# Patient Record
Sex: FEMALE | Race: WHITE | NOT HISPANIC OR LATINO | URBAN - NONMETROPOLITAN AREA
[De-identification: names, ages, dates, MRNs, and addresses within clinical notes are randomized per-mention and may not be internally consistent; named-entity substitution may affect disease eponyms.]

---

## 2017-06-24 ENCOUNTER — OFFICE VISIT (OUTPATIENT)
Dept: RETAIL CLINIC | Facility: CLINIC | Age: 37
End: 2017-06-24

## 2017-06-24 VITALS
TEMPERATURE: 99.2 F | OXYGEN SATURATION: 98 % | HEIGHT: 66 IN | WEIGHT: 238 LBS | RESPIRATION RATE: 18 BRPM | HEART RATE: 80 BPM | BODY MASS INDEX: 38.25 KG/M2

## 2017-06-24 DIAGNOSIS — H69.83 EUSTACHIAN TUBE DYSFUNCTION, BILATERAL: Primary | ICD-10-CM

## 2017-06-24 DIAGNOSIS — J30.2 SEASONAL ALLERGIC RHINITIS, UNSPECIFIED ALLERGIC RHINITIS TRIGGER: ICD-10-CM

## 2017-06-24 PROCEDURE — 99203 OFFICE O/P NEW LOW 30 MIN: CPT | Performed by: NURSE PRACTITIONER

## 2017-06-24 RX ORDER — ATENOLOL 25 MG/1
25 TABLET ORAL DAILY
COMMUNITY
End: 2021-04-19

## 2017-06-24 RX ORDER — LORATADINE 10 MG/1
10 TABLET ORAL DAILY
Qty: 30 TABLET | Refills: 0 | Status: SHIPPED | OUTPATIENT
Start: 2017-06-24 | End: 2017-07-24

## 2017-06-24 RX ORDER — FLUTICASONE PROPIONATE 50 MCG
2 SPRAY, SUSPENSION (ML) NASAL DAILY
Qty: 1 EACH | Refills: 0 | Status: SHIPPED | OUTPATIENT
Start: 2017-06-24 | End: 2017-07-24

## 2017-06-24 RX ORDER — LEVOTHYROXINE SODIUM 0.15 MG/1
150 TABLET ORAL DAILY
COMMUNITY
End: 2020-10-15

## 2017-06-24 NOTE — PROGRESS NOTES
Subjective   Usha Moe is a 36 y.o. female who presents to the clinic with complaints of earache and sore throat.    Sore Throat    This is a new problem. Episode onset: started 3 days ago. The problem has been gradually worsening. The pain is worse on the right side. There has been no fever. The pain is moderate. Associated symptoms include coughing, ear pain ( ears like they are burning and are itchy) and headaches. Pertinent negatives include no congestion. She has had no exposure to strep or mono. She has tried nothing for the symptoms.   Earache    There is pain in both ears. This is a new problem. The current episode started in the past 7 days. The problem occurs constantly. The problem has been gradually worsening. There has been no fever. Associated symptoms include coughing, headaches and a sore throat. She has tried nothing for the symptoms. recent ear infection a couple of months ago         No current outpatient prescriptions on file prior to visit.     No current facility-administered medications on file prior to visit.        No Known Allergies    Past Medical History:   Diagnosis Date   • Disease of thyroid gland        Past Surgical History:   Procedure Laterality Date   •  SECTION     • THYROIDECTOMY         Family History   Problem Relation Age of Onset   • Diabetes Mother        Social History     Social History   • Marital status:      Spouse name: N/A   • Number of children: N/A   • Years of education: N/A     Occupational History   • Not on file.     Social History Main Topics   • Smoking status: Never Smoker   • Smokeless tobacco: Not on file   • Alcohol use Not on file   • Drug use: Not on file   • Sexual activity: Not on file     Other Topics Concern   • Not on file     Social History Narrative   • No narrative on file       Review of Systems   Constitutional: Negative for chills, fatigue and fever.   HENT: Positive for ear pain ( ears like they are burning and are itchy)  "and sore throat. Negative for congestion, postnasal drip and sinus pressure.    Eyes: Positive for itching ( eyes feel itchy and are burning at times).   Respiratory: Positive for cough.    Neurological: Positive for dizziness ( was dizzy last night) and headaches.   All other systems reviewed and are negative.      Pulse 80  Temp 99.2 °F (37.3 °C) (Oral)   Resp 18  Ht 66\" (167.6 cm)  Wt 238 lb (108 kg)  SpO2 98%  BMI 38.41 kg/m2    Objective   Physical Exam   Constitutional: She is oriented to person, place, and time. She appears well-developed and well-nourished. She does not appear ill. No distress.   Temp-99.2   HENT:   Head: Normocephalic and atraumatic.   Right Ear: Hearing, external ear and ear canal normal. A middle ear effusion ( small ) is present.   Left Ear: Hearing, external ear and ear canal normal. A middle ear effusion ( greater in left ) is present.   Nose: Mucosal edema and rhinorrhea present. Right sinus exhibits no maxillary sinus tenderness and no frontal sinus tenderness. Left sinus exhibits no maxillary sinus tenderness and no frontal sinus tenderness.   Mouth/Throat: Uvula is midline and mucous membranes are normal. Posterior oropharyngeal erythema ( slightly red) present. No oropharyngeal exudate or posterior oropharyngeal edema. Tonsils are 0 on the right. Tonsils are 0 on the left. Tonsillar exudate.   Eyes: Pupils are equal, round, and reactive to light.   Neck: Normal range of motion.   Cardiovascular: Normal rate, regular rhythm, S1 normal, S2 normal and normal heart sounds.    Pulmonary/Chest: Effort normal and breath sounds normal. No respiratory distress.   Abdominal: Soft. Bowel sounds are normal. She exhibits no distension. There is no tenderness.   Musculoskeletal: Normal range of motion.   Lymphadenopathy:     She has no cervical adenopathy.   Neurological: She is alert and oriented to person, place, and time.   Skin: Skin is warm and dry.   Psychiatric: She has a normal " mood and affect. Her behavior is normal. Thought content normal.   Nursing note and vitals reviewed.      No results found for this or any previous visit.  Assessment/Plan   Usha was seen today for sore throat and earache.    Diagnoses and all orders for this visit:    Eustachian tube dysfunction, bilateral    Seasonal allergic rhinitis, unspecified allergic rhinitis trigger    Other orders  -     loratadine (CLARITIN) 10 MG tablet; Take 1 tablet by mouth Daily for 30 days.  -     fluticasone (FLONASE) 50 MCG/ACT nasal spray; 2 sprays into each nostril Daily for 30 days.

## 2017-06-24 NOTE — PATIENT INSTRUCTIONS
"Upper Respiratory Infection, Adult  Most upper respiratory infections (URIs) are a viral infection of the air passages leading to the lungs. A URI affects the nose, throat, and upper air passages. The most common type of URI is nasopharyngitis and is typically referred to as \"the common cold.\"  URIs run their course and usually go away on their own. Most of the time, a URI does not require medical attention, but sometimes a bacterial infection in the upper airways can follow a viral infection. This is called a secondary infection. Sinus and middle ear infections are common types of secondary upper respiratory infections.  Bacterial pneumonia can also complicate a URI. A URI can worsen asthma and chronic obstructive pulmonary disease (COPD). Sometimes, these complications can require emergency medical care and may be life threatening.   CAUSES  Almost all URIs are caused by viruses. A virus is a type of germ and can spread from one person to another.   RISKS FACTORS  You may be at risk for a URI if:   · You smoke.    · You have chronic heart or lung disease.  · You have a weakened defense (immune) system.    · You are very young or very old.    · You have nasal allergies or asthma.  · You work in crowded or poorly ventilated areas.  · You work in health care facilities or schools.  SIGNS AND SYMPTOMS   Symptoms typically develop 2-3 days after you come in contact with a cold virus. Most viral URIs last 7-10 days. However, viral URIs from the influenza virus (flu virus) can last 14-18 days and are typically more severe. Symptoms may include:   · Runny or stuffy (congested) nose.    · Sneezing.    · Cough.    · Sore throat.    · Headache.    · Fatigue.    · Fever.    · Loss of appetite.    · Pain in your forehead, behind your eyes, and over your cheekbones (sinus pain).  · Muscle aches.    DIAGNOSIS   Your health care provider may diagnose a URI by:  · Physical exam.  · Tests to check that your symptoms are not due to " another condition such as:  ¨ Strep throat.  ¨ Sinusitis.  ¨ Pneumonia.  ¨ Asthma.  TREATMENT   A URI goes away on its own with time. It cannot be cured with medicines, but medicines may be prescribed or recommended to relieve symptoms. Medicines may help:  · Reduce your fever.  · Reduce your cough.  · Relieve nasal congestion.  HOME CARE INSTRUCTIONS   · Take medicines only as directed by your health care provider.    · Gargle warm saltwater or take cough drops to comfort your throat as directed by your health care provider.  · Use a warm mist humidifier or inhale steam from a shower to increase air moisture. This may make it easier to breathe.  · Drink enough fluid to keep your urine clear or pale yellow.    · Eat soups and other clear broths and maintain good nutrition.    · Rest as needed.    · Return to work when your temperature has returned to normal or as your health care provider advises. You may need to stay home longer to avoid infecting others. You can also use a face mask and careful hand washing to prevent spread of the virus.  · Increase the usage of your inhaler if you have asthma.    · Do not use any tobacco products, including cigarettes, chewing tobacco, or electronic cigarettes. If you need help quitting, ask your health care provider.  PREVENTION   The best way to protect yourself from getting a cold is to practice good hygiene.   · Avoid oral or hand contact with people with cold symptoms.    · Wash your hands often if contact occurs.    There is no clear evidence that vitamin C, vitamin E, echinacea, or exercise reduces the chance of developing a cold. However, it is always recommended to get plenty of rest, exercise, and practice good nutrition.   SEEK MEDICAL CARE IF:   · You are getting worse rather than better.    · Your symptoms are not controlled by medicine.    · You have chills.  · You have worsening shortness of breath.  · You have brown or red mucus.  · You have yellow or brown nasal  discharge.  · You have pain in your face, especially when you bend forward.  · You have a fever.  · You have swollen neck glands.  · You have pain while swallowing.  · You have white areas in the back of your throat.  SEEK IMMEDIATE MEDICAL CARE IF:   · You have severe or persistent:    Headache.    Ear pain.    Sinus pain.    Chest pain.  · You have chronic lung disease and any of the following:    Wheezing.    Prolonged cough.    Coughing up blood.    A change in your usual mucus.  · You have a stiff neck.  · You have changes in your:    Vision.    Hearing.    Thinking.    Mood.  MAKE SURE YOU:   · Understand these instructions.  · Will watch your condition.  · Will get help right away if you are not doing well or get worse.     This information is not intended to replace advice given to you by your health care provider. Make sure you discuss any questions you have with your health care provider.     Document Released: 06/13/2002 Document Revised: 05/03/2016 Document Reviewed: 03/25/2015  [employer] Interactive Patient Education ©2017 [employer]  Nasal Allergies  Nasal allergies are a reaction to allergens in the air. Allergens are particles in the air that cause your body to have an allergic reaction. Nasal allergies are not passed from person to person (are not contagious). They cannot be cured, but they can be controlled.  CAUSES  Seasonal nasal allergies (hay fever) are caused by pollen allergens that come from grasses, trees, and weeds. Year-round nasal allergies (perennial allergic rhinitis) are caused by allergens such as house dust mites, pet dander, and mold spores.  RISK FACTORS  The following factors may make you more likely to develop this condition:  · Having certain health conditions. These include:    Other types of allergies, such as food allergies.    Asthma.    Eczema.  · Having a close relative who has allergies or asthma.  · Exposure to house dust, pollen, dander, or other allergens at home or  at work.  · Exposure to air pollution or secondhand smoke when you were a child.  SYMPTOMS  Symptoms of this condition include:  · Sneezing.  · Runny nose or stuffy nose (congestion).  · Watery (tearing) eyes.  · Itchy eyes, nose, mouth, throat, skin, or other area.  · Sore throat.  · Headache.  · Decreased sense of smell or taste.  · Fatigue. This may occur if you have trouble sleeping due to allergies.  · Swollen eyelids.  DIAGNOSIS  This condition is diagnosed with a medical history and physical exam. Allergy testing may be done to determine exactly what triggers your nasal allergies.  TREATMENT  There is no cure for nasal allergies. Treatment focuses on controlling your symptoms, and it may include:  · Medicines that block allergy symptoms. These may include allergy shots, nasal sprays, and oral antihistamines.  · Avoiding the allergen.  HOME CARE INSTRUCTIONS  · Avoid the allergen that is causing your symptoms, if possible.  · Keep windows closed. If possible, use air conditioning when pollen counts are high.  · Do not use fans in your home.  · Do not hang clothes outside to dry.  · Wear sunglasses to keep pollen out of your eyes.  · Wash your hands right away after you touch household pets.  · Take over-the-counter and prescription medicines only as told by your health care provider.  · Keep all follow-up visits as told by your health care provider. This is important.  SEEK MEDICAL CARE IF:  · You have a fever.  · You develop a cough that does not go away (is persistent).  · You start to wheeze.  · Your symptoms do not improve with treatment.  · You have thick nasal discharge.  · You start to have nosebleeds.  SEEK IMMEDIATE MEDICAL CARE IF:  · Your tongue or your lips are swollen.  · You have trouble breathing.  · You feel light-headed or you feel like you are going to faint.  · You have cold sweats.     This information is not intended to replace advice given to you by your health care provider. Make sure  you discuss any questions you have with your health care provider.     Document Released: 12/18/2006 Document Revised: 04/10/2017 Document Reviewed: 06/29/2016  [employer] Interactive Patient Education ©2017 [employer]  Eustachian Tube Dysfunction  The eustachian tube connects the middle ear to the back of the nose. It regulates air pressure in the middle ear by allowing air to move between the ear and nose. It also helps to drain fluid from the middle ear space. When the eustachian tube does not function properly, air pressure, fluid, or both can build up in the middle ear.  Eustachian tube dysfunction can affect one or both ears.  CAUSES  This condition happens when the eustachian tube becomes blocked or cannot open normally. This may result from:  · Ear infections.  · Colds and other upper respiratory infections.  · Allergies.  · Irritation, such as from cigarette smoke or acid from the stomach coming up into the esophagus (gastroesophageal reflux).  · Sudden changes in air pressure, such as from descending in an airplane.  · Abnormal growths in the nose or throat, such as nasal polyps, tumors, or enlarged tissue at the back of the throat (adenoids).  RISK FACTORS  This condition may be more likely to develop in people who smoke and people who are overweight. Eustachian tube dysfunction may also be more likely to develop in children, especially children who have:  · Certain birth defects of the mouth, such as cleft palate.  · Large tonsils and adenoids.  SYMPTOMS  Symptoms of this condition may include:  · A feeling of fullness in the ear.  · Ear pain.  · Clicking or popping noises in the ear.  · Ringing in the ear.  · Hearing loss.  · Loss of balance.  Symptoms may get worse when the air pressure around you changes, such as when you travel to an area of high elevation or fly on an airplane.   DIAGNOSIS  This condition may be diagnosed based on:  · Your symptoms.  · A physical exam of your ear, nose, and  "throat.  · Tests, such as those that measure:    The movement of your eardrum (tympanogram).    Your hearing (audiometry).  TREATMENT  Treatment depends on the cause and severity of your condition. If your symptoms are mild, you may be able to relieve your symptoms by moving air into (\"popping\") your ears. If you have symptoms of fluid in your ears, treatment may include:  · Decongestants.  · Antihistamines.  · Nasal sprays or ear drops that contain medicines that reduce swelling (steroids).  In some cases, you may need to have a procedure to drain the fluid in your eardrum (myringotomy). In this procedure, a small tube is placed in the eardrum to:  · Drain the fluid.  · Restore the air in the middle ear space.  HOME CARE INSTRUCTIONS  · Take over-the-counter and prescription medicines only as told by your health care provider.  · Use techniques to help pop your ears as recommended by your health care provider. These may include:    Chewing gum.    Yawning.    Frequent, forceful swallowing.    Closing your mouth, holding your nose closed, and gently blowing as if you are trying to blow air out of your nose.  · Do not do any of the following until your health care provider approves:    Travel to high altitudes.    Fly in airplanes.    Work in a pressurized cabin or room.    Scuba dive.  · Keep your ears dry. Dry your ears completely after showering or bathing.  · Do not smoke.  · Keep all follow-up visits as told by your health care provider. This is important.  SEEK MEDICAL CARE IF:  · Your symptoms do not go away after treatment.  · Your symptoms come back after treatment.  · You are unable to pop your ears.  · You have:    A fever.    Pain in your ear.    Pain in your head or neck.    Fluid draining from your ear.  · Your hearing suddenly changes.  · You become very dizzy.  · You lose your balance.     This information is not intended to replace advice given to you by your health care provider. Make sure you " discuss any questions you have with your health care provider.     Document Released: 01/13/2017 Document Reviewed: 01/13/2017  [employer] Interactive Patient Education ©2017 [employer]

## 2020-10-14 RX ORDER — LEVOTHYROXINE SODIUM 0.15 MG/1
150 TABLET ORAL DAILY
Status: CANCELLED | OUTPATIENT
Start: 2020-10-14

## 2020-10-15 RX ORDER — LEVOTHYROXINE SODIUM 175 UG/1
175 TABLET ORAL DAILY
Qty: 30 TABLET | Refills: 0 | Status: SHIPPED | OUTPATIENT
Start: 2020-10-15 | End: 2020-10-20 | Stop reason: DRUGHIGH

## 2020-10-15 NOTE — TELEPHONE ENCOUNTER
Called pt to verify dose-- records indocated she was on levothyroxine 175mcg daily-- and she confirmed-- sent rx.  RT

## 2020-10-19 ENCOUNTER — LAB (OUTPATIENT)
Dept: LAB | Facility: HOSPITAL | Age: 40
End: 2020-10-19

## 2020-10-19 ENCOUNTER — OFFICE VISIT (OUTPATIENT)
Dept: ENDOCRINOLOGY | Facility: CLINIC | Age: 40
End: 2020-10-19

## 2020-10-19 VITALS
HEART RATE: 80 BPM | WEIGHT: 242 LBS | TEMPERATURE: 98 F | SYSTOLIC BLOOD PRESSURE: 126 MMHG | HEIGHT: 63 IN | OXYGEN SATURATION: 98 % | DIASTOLIC BLOOD PRESSURE: 68 MMHG | BODY MASS INDEX: 42.88 KG/M2

## 2020-10-19 DIAGNOSIS — E89.0 POST-SURGICAL HYPOTHYROIDISM: Primary | ICD-10-CM

## 2020-10-19 DIAGNOSIS — E89.0 POST-SURGICAL HYPOTHYROIDISM: ICD-10-CM

## 2020-10-19 LAB
T4 FREE SERPL-MCNC: 1.3 NG/DL (ref 0.93–1.7)
TSH SERPL DL<=0.05 MIU/L-ACNC: 4.58 UIU/ML (ref 0.27–4.2)

## 2020-10-19 PROCEDURE — 84439 ASSAY OF FREE THYROXINE: CPT | Performed by: PHYSICIAN ASSISTANT

## 2020-10-19 PROCEDURE — 99213 OFFICE O/P EST LOW 20 MIN: CPT | Performed by: PHYSICIAN ASSISTANT

## 2020-10-19 PROCEDURE — 84443 ASSAY THYROID STIM HORMONE: CPT | Performed by: PHYSICIAN ASSISTANT

## 2020-10-19 NOTE — PROGRESS NOTES
"     Office Note      Date: 10/19/2020  Patient Name: Usha Moe  MRN: [id number]  : 1980    Chief Complaint   Patient presents with   • Thyroid Problem       History of Present Illness:   Usha Moe is a 40 y.o. female who presents today for hypothyroidism.  Increased her levothyroxine dose in March to 175 mcg daily.  She reports she has felt okay on the new dose.  She reports she has noted weight gain and has noted some muscle spasms in her back at times.  He is still in the process of trying to adopt her nephew.  She denies any changes in her neck or eyes.  She denies any other new medical history since her last appointment.    Subjective      Review of Systems:  Review of Systems   Constitutional: Positive for unexpected weight change. Negative for activity change, appetite change, chills, diaphoresis, fatigue and fever.   HENT: Negative.    Eyes: Negative.    Respiratory: Negative.    Cardiovascular: Negative.    Gastrointestinal: Negative.    Endocrine: Negative.    Genitourinary: Negative.    Musculoskeletal: Negative.    Skin: Negative.    Neurological: Negative.    Hematological: Negative.    Psychiatric/Behavioral: Negative.        The following portions of the patient's history were reviewed and updated as appropriate: allergies, current medications, past family history, past medical history, past social history, past surgical history and problem list.    Objective     Vitals:    10/19/20 1503   BP: 126/68   BP Location: Left arm   Patient Position: Sitting   Cuff Size: Adult   Pulse: 80   Temp: 98 °F (36.7 °C)   TempSrc: Infrared   SpO2: 98%   Weight: 110 kg (242 lb)   Height: 160 cm (63\")   PainSc: 0-No pain     Body mass index is 42.87 kg/m².    Physical Exam  Constitutional:       General: She is not in acute distress.     Appearance: Normal appearance.   Neck:      Thyroid: No thyroid mass, thyromegaly or thyroid tenderness.   Lymphadenopathy:      Cervical: No cervical adenopathy. "   Neurological:      Mental Status: She is alert.           Assessment / Plan      Assessment & Plan:  1. Post-surgical hypothyroidism  Thyroid labs pending.  Will send note with results and plan.  Will send refill for prescription once labs reviewed.  Patient to call as needed.  We will plan on follow-up in 6 months unless dose adjusted then will see patient in 3 months.  - TSH; Future  - T4, Free; Future       Return in about 6 months (around 4/19/2021) for Recheck.    JAKE Chicas   10/19/2020

## 2020-10-20 RX ORDER — LEVOTHYROXINE SODIUM 0.2 MG/1
200 TABLET ORAL DAILY
Qty: 90 TABLET | Refills: 1 | Status: SHIPPED | OUTPATIENT
Start: 2020-10-20 | End: 2021-04-20 | Stop reason: SDUPTHER

## 2021-04-19 ENCOUNTER — OFFICE VISIT (OUTPATIENT)
Dept: ENDOCRINOLOGY | Facility: CLINIC | Age: 41
End: 2021-04-19

## 2021-04-19 ENCOUNTER — LAB (OUTPATIENT)
Dept: LAB | Facility: HOSPITAL | Age: 41
End: 2021-04-19

## 2021-04-19 VITALS
WEIGHT: 227.6 LBS | DIASTOLIC BLOOD PRESSURE: 84 MMHG | TEMPERATURE: 97.3 F | HEART RATE: 83 BPM | BODY MASS INDEX: 38.86 KG/M2 | OXYGEN SATURATION: 99 % | HEIGHT: 64 IN | SYSTOLIC BLOOD PRESSURE: 154 MMHG

## 2021-04-19 DIAGNOSIS — E89.0 POSTSURGICAL HYPOTHYROIDISM: Primary | ICD-10-CM

## 2021-04-19 DIAGNOSIS — E89.0 POSTSURGICAL HYPOTHYROIDISM: ICD-10-CM

## 2021-04-19 LAB
T4 FREE SERPL-MCNC: 1.9 NG/DL (ref 0.93–1.7)
TSH SERPL DL<=0.05 MIU/L-ACNC: 0.05 UIU/ML (ref 0.27–4.2)

## 2021-04-19 PROCEDURE — 84443 ASSAY THYROID STIM HORMONE: CPT

## 2021-04-19 PROCEDURE — 99213 OFFICE O/P EST LOW 20 MIN: CPT | Performed by: PHYSICIAN ASSISTANT

## 2021-04-19 PROCEDURE — 84439 ASSAY OF FREE THYROXINE: CPT

## 2021-04-19 NOTE — PROGRESS NOTES
"     Office Note      Date: 2021  Patient Name: Usha Moe  MRN: [id number]  : 1980    Chief Complaint   Patient presents with   • Thyroid Problem       History of Present Illness:   Usha Moe is a 40 y.o. female who presents today for hypothyroidism.  She reports she is taking the levothyroxine 200 mcg regularly and correctly.  She reports she has a headache today and her blood pressure was higher than normal.  She reports she has noted increased headaches for the last 1 to 2 weeks.  She reports she has felt stiff and has noted increased fatigue.  She reports she does have some heat intolerance and notes palpitations occasionally.  She denies any changes in her neck today.  She reports she has officially adopted her 2-year-old nephew and all is going well.    Subjective      Review of Systems:  Review of Systems   Constitutional: Positive for fatigue.   Cardiovascular: Positive for palpitations. Negative for chest pain and leg swelling.   Gastrointestinal: Negative.    Endocrine: Positive for heat intolerance.   Neurological: Positive for headaches.       The following portions of the patient's history were reviewed and updated as appropriate: allergies, current medications, past family history, past medical history, past social history, past surgical history and problem list.    Objective     Vitals:    21 1305 21 1342   BP: 160/84 154/84   BP Location:  Right arm   Patient Position:  Sitting   Pulse: 83    Temp: 97.3 °F (36.3 °C)    TempSrc: Infrared    SpO2: 99%    Weight: 103 kg (227 lb 9.6 oz)    Height: 162.6 cm (64\")    PainSc:   7    PainLoc: Head      Body mass index is 39.07 kg/m².    Physical Exam  Vitals reviewed.   Constitutional:       General: She is not in acute distress.     Appearance: Normal appearance.   Neck:      Thyroid: No thyroid mass or thyroid tenderness.      Comments: No palpable thyroid tissue  Lymphadenopathy:      Cervical: No cervical adenopathy. "   Neurological:      Mental Status: She is alert.             Assessment / Plan      Assessment & Plan:  1. Postsurgical hypothyroidism  TFTs pending.  Will send note with results and plan.  She will continue levothyroxine 200 mcg daily for now.  I will send a new prescription once her labs are reviewed.  Rechecked her blood pressure and this was slightly lower but encouraged her to check this at home for monitoring.  She reports she has a cuff.  She will contact her primary care physician if this remains elevated.  - T4, Free; Future  - TSH; Future       Return in about 6 months (around 10/19/2021) for Recheck.    JAKE Chicas   04/19/2021

## 2021-04-20 RX ORDER — LEVOTHYROXINE SODIUM 175 UG/1
175 TABLET ORAL EVERY OTHER DAY
Qty: 45 TABLET | Refills: 1 | Status: SHIPPED | OUTPATIENT
Start: 2021-04-20 | End: 2021-07-23 | Stop reason: SDUPTHER

## 2021-04-20 RX ORDER — LEVOTHYROXINE SODIUM 0.2 MG/1
200 TABLET ORAL EVERY OTHER DAY
Qty: 45 TABLET | Refills: 1 | Status: SHIPPED | OUTPATIENT
Start: 2021-04-20 | End: 2021-09-07 | Stop reason: SDUPTHER

## 2021-04-21 ENCOUNTER — TELEPHONE (OUTPATIENT)
Dept: ENDOCRINOLOGY | Facility: CLINIC | Age: 41
End: 2021-04-21

## 2021-07-23 RX ORDER — LEVOTHYROXINE SODIUM 175 UG/1
175 TABLET ORAL EVERY OTHER DAY
Qty: 45 TABLET | Refills: 0 | Status: SHIPPED | OUTPATIENT
Start: 2021-07-23 | End: 2021-09-07 | Stop reason: SDUPTHER

## 2021-07-23 NOTE — TELEPHONE ENCOUNTER
Patient called and had to reschedule her appointment that was scheduled for Monday 7/26/21. She said she is going to need a refill on her levothyroxine (SYNTHROID, LEVOTHROID) 175 MCG tablet.

## 2021-09-02 ENCOUNTER — LAB (OUTPATIENT)
Dept: LAB | Facility: HOSPITAL | Age: 41
End: 2021-09-02

## 2021-09-02 ENCOUNTER — OFFICE VISIT (OUTPATIENT)
Dept: ENDOCRINOLOGY | Facility: CLINIC | Age: 41
End: 2021-09-02

## 2021-09-02 VITALS
OXYGEN SATURATION: 99 % | WEIGHT: 249 LBS | HEART RATE: 80 BPM | HEIGHT: 64 IN | DIASTOLIC BLOOD PRESSURE: 92 MMHG | SYSTOLIC BLOOD PRESSURE: 158 MMHG | BODY MASS INDEX: 42.51 KG/M2

## 2021-09-02 DIAGNOSIS — E89.0 POSTSURGICAL HYPOTHYROIDISM: ICD-10-CM

## 2021-09-02 DIAGNOSIS — E89.0 POSTSURGICAL HYPOTHYROIDISM: Primary | ICD-10-CM

## 2021-09-02 LAB
T4 FREE SERPL-MCNC: 1.49 NG/DL (ref 0.93–1.7)
TSH SERPL DL<=0.05 MIU/L-ACNC: 2.28 UIU/ML (ref 0.27–4.2)

## 2021-09-02 PROCEDURE — 84443 ASSAY THYROID STIM HORMONE: CPT

## 2021-09-02 PROCEDURE — 99213 OFFICE O/P EST LOW 20 MIN: CPT | Performed by: PHYSICIAN ASSISTANT

## 2021-09-02 PROCEDURE — 84439 ASSAY OF FREE THYROXINE: CPT

## 2021-09-02 NOTE — PROGRESS NOTES
"     Office Note      Date: 2021  Patient Name: Usha Moe  MRN: [id number]  : 1980    Chief Complaint   Patient presents with   • Hypothyroidism       History of Present Illness:   Usha Moe is a 40 y.o. female who presents today for follow-up for hypothyroidism.  At her appointment in April we reduced her levothyroxine dose from 200 mcg daily to alternating 175 and 200 mcg every other day.  She reports her energy has been okay on the reduced dose and the palpitations have resolved.  She reports she continues to have some headaches but this is been better as well.  She reports about 2 or 3 weeks ago she was taking 175 mcg daily because she was out of the 200 mcg and needed to  the prescription.  She has been back on the alternating dose for 2 or 3 weeks now.  She reports things have been really stressful at work a lot of people have been out with Covid.  She reports she has not been vaccinated and is unsure if she plans to or not.  She denies any changes in her neck today.    Subjective      Review of Systems:  Review of Systems   Constitutional: Negative.    Cardiovascular: Negative.    Gastrointestinal: Negative.    Endocrine: Negative.    Neurological: Positive for headaches.       The following portions of the patient's history were reviewed and updated as appropriate: allergies, current medications, past family history, past medical history, past social history, past surgical history and problem list.    Objective     Vitals:    21 1323   BP: 158/92   BP Location: Left arm   Patient Position: Sitting   Cuff Size: Adult   Pulse: 80   SpO2: 99%   Weight: 113 kg (249 lb)   Height: 162.6 cm (64\")   PainSc: 0-No pain     Body mass index is 42.74 kg/m².    Physical Exam  Vitals reviewed.   Constitutional:       General: She is not in acute distress.     Appearance: Normal appearance.   Neurological:      Mental Status: She is alert.         TSH  Lab Results   Component Value Date "    TSH 0.046 (L) 04/19/2021       FREE T4  Lab Results   Component Value Date    FREET4 1.90 (H) 04/19/2021          Assessment / Plan      Assessment & Plan:  1. Postsurgical hypothyroidism  TFTs pending.  Will send note with results and plan.  Will send new prescription once labs have been reviewed.  Her blood pressure is elevated today.  We discussed this potentially contributing to her headaches.  I encouraged her to check in with her primary care physician if her blood pressure remains elevated at home.  Discussed the Covid vaccine my personal experience with it as well as my families.  I encouraged her to get the vaccine.  - T4, Free; Future  - TSH; Future       Return in about 3 months (around 12/2/2021) for Recheck 3-4 mos.     This note was dictated using Dragon voice recognition.    JAKE Chicas   09/02/2021

## 2021-09-07 RX ORDER — LEVOTHYROXINE SODIUM 175 UG/1
175 TABLET ORAL EVERY OTHER DAY
Qty: 45 TABLET | Refills: 1 | Status: SHIPPED | OUTPATIENT
Start: 2021-09-07 | End: 2021-10-25 | Stop reason: SDUPTHER

## 2021-09-07 RX ORDER — LEVOTHYROXINE SODIUM 0.2 MG/1
200 TABLET ORAL EVERY OTHER DAY
Qty: 45 TABLET | Refills: 1 | Status: SHIPPED | OUTPATIENT
Start: 2021-09-07 | End: 2021-11-23 | Stop reason: SDUPTHER

## 2021-10-22 NOTE — TELEPHONE ENCOUNTER
Patient called and stated she is out of her levothyroxine (SYNTHROID, LEVOTHROID) 175 MCG tablet and needs a refill. Please advise.

## 2021-10-25 RX ORDER — LEVOTHYROXINE SODIUM 175 UG/1
175 TABLET ORAL EVERY OTHER DAY
Qty: 45 TABLET | Refills: 0 | Status: SHIPPED | OUTPATIENT
Start: 2021-10-25 | End: 2021-11-23

## 2021-11-23 RX ORDER — LEVOTHYROXINE SODIUM 175 UG/1
TABLET ORAL
Qty: 45 TABLET | Refills: 0 | Status: SHIPPED | OUTPATIENT
Start: 2021-11-23 | End: 2022-01-04 | Stop reason: SDUPTHER

## 2021-11-23 RX ORDER — LEVOTHYROXINE SODIUM 0.2 MG/1
200 TABLET ORAL EVERY OTHER DAY
Qty: 45 TABLET | Refills: 0 | Status: SHIPPED | OUTPATIENT
Start: 2021-11-23 | End: 2022-01-04 | Stop reason: SDUPTHER

## 2022-01-03 ENCOUNTER — LAB (OUTPATIENT)
Dept: LAB | Facility: HOSPITAL | Age: 42
End: 2022-01-03

## 2022-01-03 ENCOUNTER — OFFICE VISIT (OUTPATIENT)
Dept: ENDOCRINOLOGY | Facility: CLINIC | Age: 42
End: 2022-01-03

## 2022-01-03 VITALS
OXYGEN SATURATION: 97 % | BODY MASS INDEX: 42.99 KG/M2 | DIASTOLIC BLOOD PRESSURE: 82 MMHG | TEMPERATURE: 97.3 F | SYSTOLIC BLOOD PRESSURE: 126 MMHG | HEIGHT: 64 IN | WEIGHT: 251.8 LBS | HEART RATE: 100 BPM

## 2022-01-03 DIAGNOSIS — E89.0 POSTSURGICAL HYPOTHYROIDISM: Primary | ICD-10-CM

## 2022-01-03 DIAGNOSIS — E89.0 POSTSURGICAL HYPOTHYROIDISM: ICD-10-CM

## 2022-01-03 PROCEDURE — 84443 ASSAY THYROID STIM HORMONE: CPT

## 2022-01-03 PROCEDURE — 99213 OFFICE O/P EST LOW 20 MIN: CPT | Performed by: PHYSICIAN ASSISTANT

## 2022-01-03 PROCEDURE — 84439 ASSAY OF FREE THYROXINE: CPT

## 2022-01-03 NOTE — PROGRESS NOTES
"     Office Note      Date: 2022  Patient Name: Usha Moe  MRN: [id number]  : 1980    Chief Complaint   Patient presents with   • Hypothyroidism     3 month follow up        History of Present Illness:   Usha Moe is a 41 y.o. female who presents today for hypothyroidism.  She remains on levothyroxine 200 mcg and 175 mcg alternating every other day.  She reports she is taking this regularly and correctly.  She reports she feels well today with no symptoms of hyper or hypothyroidism.    Subjective      Review of Systems:  Review of Systems   Constitutional: Negative.    Cardiovascular: Negative.    Gastrointestinal: Negative.    Endocrine: Negative.    Neurological: Negative.        The following portions of the patient's history were reviewed and updated as appropriate: allergies, current medications, past family history, past medical history, past social history, past surgical history and problem list.    Objective     Vitals:    22 0924   BP: 126/82   Pulse: 100   Temp: 97.3 °F (36.3 °C)   SpO2: 97%   Weight: 114 kg (251 lb 12.8 oz)   Height: 162.6 cm (64.02\")     Body mass index is 43.2 kg/m².    Physical Exam  Vitals reviewed.   Constitutional:       General: She is not in acute distress.     Appearance: Normal appearance.   Neck:      Thyroid: No thyroid mass, thyromegaly or thyroid tenderness.   Lymphadenopathy:      Cervical: No cervical adenopathy.   Neurological:      Mental Status: She is alert.             Assessment / Plan      Assessment & Plan:  1. Postsurgical hypothyroidism  TFTs pending.  I will send a note with results and plan.  For now she will continue levothyroxine 200 mcg alternating with 175 mcg every other day.  I will send a new prescription once her labs have been reviewed.  We will plan on a follow-up appointment in 6 months as long as all labs are normal and we do not make any adjustments in her medications.  Patient to call as needed.  - T4, Free; Future  - " TSH; Future       Return in about 6 months (around 7/3/2022) for Recheck.  This note was dictated using Dragon voice recognition.      JAKE Chicas   01/03/2022

## 2022-01-04 LAB
T4 FREE SERPL-MCNC: 1.63 NG/DL (ref 0.93–1.7)
TSH SERPL DL<=0.05 MIU/L-ACNC: 0.39 UIU/ML (ref 0.27–4.2)

## 2022-01-04 RX ORDER — LEVOTHYROXINE SODIUM 0.2 MG/1
200 TABLET ORAL EVERY OTHER DAY
Qty: 45 TABLET | Refills: 2 | Status: SHIPPED | OUTPATIENT
Start: 2022-01-04 | End: 2022-04-01 | Stop reason: SDUPTHER

## 2022-01-04 RX ORDER — LEVOTHYROXINE SODIUM 175 UG/1
175 TABLET ORAL EVERY OTHER DAY
Qty: 45 TABLET | Refills: 2 | Status: SHIPPED | OUTPATIENT
Start: 2022-01-04 | End: 2022-04-01 | Stop reason: SDUPTHER

## 2022-04-01 RX ORDER — LEVOTHYROXINE SODIUM 175 UG/1
175 TABLET ORAL EVERY OTHER DAY
Qty: 45 TABLET | Refills: 1 | Status: SHIPPED | OUTPATIENT
Start: 2022-04-01 | End: 2022-07-12 | Stop reason: SDUPTHER

## 2022-04-01 RX ORDER — LEVOTHYROXINE SODIUM 0.2 MG/1
200 TABLET ORAL EVERY OTHER DAY
Qty: 45 TABLET | Refills: 1 | Status: SHIPPED | OUTPATIENT
Start: 2022-04-01 | End: 2022-07-12 | Stop reason: SDUPTHER

## 2022-04-01 NOTE — TELEPHONE ENCOUNTER
PT CALLED REQUESTING BOTH DOSES OF LEVOTHYROXINE TO BE SENT IN TO [facility] IN [place], [place].

## 2022-07-11 ENCOUNTER — LAB (OUTPATIENT)
Dept: LAB | Facility: HOSPITAL | Age: 42
End: 2022-07-11

## 2022-07-11 ENCOUNTER — OFFICE VISIT (OUTPATIENT)
Dept: ENDOCRINOLOGY | Facility: CLINIC | Age: 42
End: 2022-07-11

## 2022-07-11 VITALS
SYSTOLIC BLOOD PRESSURE: 146 MMHG | WEIGHT: 248 LBS | HEART RATE: 98 BPM | OXYGEN SATURATION: 99 % | BODY MASS INDEX: 42.34 KG/M2 | DIASTOLIC BLOOD PRESSURE: 90 MMHG | HEIGHT: 64 IN

## 2022-07-11 DIAGNOSIS — E89.0 POSTSURGICAL HYPOTHYROIDISM: ICD-10-CM

## 2022-07-11 DIAGNOSIS — E89.0 POSTSURGICAL HYPOTHYROIDISM: Primary | ICD-10-CM

## 2022-07-11 LAB
T4 FREE SERPL-MCNC: 1.6 NG/DL (ref 0.93–1.7)
TSH SERPL DL<=0.05 MIU/L-ACNC: 1.64 UIU/ML (ref 0.27–4.2)

## 2022-07-11 PROCEDURE — 84443 ASSAY THYROID STIM HORMONE: CPT

## 2022-07-11 PROCEDURE — 99213 OFFICE O/P EST LOW 20 MIN: CPT | Performed by: PHYSICIAN ASSISTANT

## 2022-07-11 PROCEDURE — 84439 ASSAY OF FREE THYROXINE: CPT

## 2022-07-11 NOTE — PROGRESS NOTES
"     Office Note      Date: 2022  Patient Name: Usha Moe  MRN: [id number]  : 1980    Chief Complaint   Patient presents with   • Hypothyroidism       History of Present Illness:   Usha Moe is a 41 y.o. female who presents today for follow-up for hypothyroidism.  She remains on levothyroxine 200 mcg alternating with 175 mcg every other day.  She is taking this regularly and correctly.  She reports she feels well.  She denies any symptoms of hyper or hypothyroidism.  She denies any changes in her neck today.  She reports her 16-year-old daughter was told she has borderline diabetes so they have been making some changes at home for healthier habits.    Subjective      Review of Systems:  Review of Systems   Constitutional: Negative.    Cardiovascular: Negative.    Gastrointestinal: Negative.    Endocrine: Negative.    Neurological: Negative.        The following portions of the patient's history were reviewed and updated as appropriate: allergies, current medications, past family history, past medical history, past social history, past surgical history and problem list.    Objective     Vitals:    22 0949   BP: 146/90   Pulse: 98   SpO2: 99%   Weight: 112 kg (248 lb)   Height: 162.6 cm (64\")   PainSc: 0-No pain     Body mass index is 42.57 kg/m².    Physical Exam  Vitals reviewed.   Constitutional:       General: She is not in acute distress.     Appearance: Normal appearance.   Neck:      Thyroid: No thyroid mass, thyromegaly or thyroid tenderness.   Lymphadenopathy:      Cervical: No cervical adenopathy.   Neurological:      Mental Status: She is alert.         TSH  Lab Results   Component Value Date    TSH 0.391 2022       FREE T4  Lab Results   Component Value Date    FREET4 1.63 2022       Assessment / Plan      Assessment & Plan:  1. Postsurgical hypothyroidism  TFTs pending.  Will send note with results and plan.  For now she will continue levothyroxine 200 mcg " alternating with 175 mcg every other day.  I will refill her prescription once her labs have been reviewed.  Her blood pressure was up some today.  She was in a rush to get here and was running late.  She reports typically okay at home.  She will continue to monitor this.  - T4, Free; Future  - TSH; Future       Return in about 6 months (around 1/11/2023) for Recheck.    This note was dictated using Dragon voice recognition.    JAKE Chicas   07/11/2022

## 2022-07-12 RX ORDER — LEVOTHYROXINE SODIUM 0.2 MG/1
200 TABLET ORAL EVERY OTHER DAY
Qty: 45 TABLET | Refills: 1 | Status: SHIPPED | OUTPATIENT
Start: 2022-07-12 | End: 2023-03-27 | Stop reason: SDUPTHER

## 2022-07-12 RX ORDER — LEVOTHYROXINE SODIUM 175 UG/1
175 TABLET ORAL EVERY OTHER DAY
Qty: 45 TABLET | Refills: 1 | Status: SHIPPED | OUTPATIENT
Start: 2022-07-12 | End: 2023-03-27 | Stop reason: SDUPTHER

## 2023-03-27 RX ORDER — LEVOTHYROXINE SODIUM 0.2 MG/1
200 TABLET ORAL EVERY OTHER DAY
Qty: 30 TABLET | Refills: 0 | Status: SHIPPED | OUTPATIENT
Start: 2023-03-27 | End: 2023-09-23

## 2023-03-27 RX ORDER — LEVOTHYROXINE SODIUM 175 UG/1
175 TABLET ORAL EVERY OTHER DAY
Qty: 30 TABLET | Refills: 0 | Status: SHIPPED | OUTPATIENT
Start: 2023-03-27

## 2023-03-27 NOTE — TELEPHONE ENCOUNTER
PT CALLED REQUESTING BOTH DOSES OF LEVOTHYROXINE TO BE SENT IN TO [facility] IN [place], [place].

## 2023-04-17 ENCOUNTER — OFFICE VISIT (OUTPATIENT)
Dept: ENDOCRINOLOGY | Facility: CLINIC | Age: 43
End: 2023-04-17
Payer: COMMERCIAL

## 2023-04-17 VITALS
WEIGHT: 251 LBS | SYSTOLIC BLOOD PRESSURE: 136 MMHG | OXYGEN SATURATION: 98 % | HEIGHT: 64 IN | HEART RATE: 99 BPM | BODY MASS INDEX: 42.85 KG/M2 | DIASTOLIC BLOOD PRESSURE: 84 MMHG

## 2023-04-17 DIAGNOSIS — E89.0 POSTSURGICAL HYPOTHYROIDISM: Primary | ICD-10-CM

## 2023-04-17 PROCEDURE — 84439 ASSAY OF FREE THYROXINE: CPT | Performed by: PHYSICIAN ASSISTANT

## 2023-04-17 PROCEDURE — 84443 ASSAY THYROID STIM HORMONE: CPT | Performed by: PHYSICIAN ASSISTANT

## 2023-04-17 PROCEDURE — 99213 OFFICE O/P EST LOW 20 MIN: CPT | Performed by: PHYSICIAN ASSISTANT

## 2023-04-17 RX ORDER — LISINOPRIL 10 MG/1
1 TABLET ORAL DAILY
COMMUNITY
Start: 2023-01-23

## 2023-04-17 NOTE — PROGRESS NOTES
"     Office Note      Date: 2023  Patient Name: Usha Moe  MRN: [id number]  : 1980    Chief Complaint   Patient presents with   • Hypothyroidism       History of Present Illness:   Usha Moe is a 42 y.o. female who presents today for follow-up for postsurgical hypothyroidism.  She remains on levothyroxine 200 mcg alternating with 175 mcg every other day.  She reports she has been taking these regularly and correctly.  She reports she may miss a dose every month or 2 but not on a regular basis.  She reports she had some issues with her back and urinary tract infection about a month ago but overall she has been feeling good recently.  She denies any symptoms of hyper or hypothyroidism.  She denies any changes in her neck today.    Subjective      Review of Systems:  Review of Systems   Constitutional: Negative.    Cardiovascular: Negative.    Gastrointestinal: Negative.    Endocrine: Negative.    Neurological: Negative.        The following portions of the patient's history were reviewed and updated as appropriate: allergies, current medications, past family history, past medical history, past social history, past surgical history and problem list.    Objective     Vitals:    23 1257   BP: 136/84   Pulse: 99   SpO2: 98%   Weight: 114 kg (251 lb)   Height: 162.6 cm (64\")     Body mass index is 43.08 kg/m².    Physical Exam  Vitals reviewed.   Constitutional:       General: She is not in acute distress.     Appearance: Normal appearance.   Neck:      Thyroid: No thyroid mass, thyromegaly or thyroid tenderness.      Comments: No palpable thyroid tissue.  Lymphadenopathy:      Cervical: No cervical adenopathy.   Neurological:      Mental Status: She is alert.           Assessment / Plan      Assessment & Plan:  1. Postsurgical hypothyroidism  TFTs pending today.  Will send note with results and plan.  For now she will continue levothyroxine 200 mcg alternating with 175 mcg every other day.  I " will refill her prescription once her labs have been reviewed.  Since her labs have been stable we will plan to check back in in 1 year unless anything changes based on lab results.  Patient to call as needed.  - T4, Free; Future  - TSH; Future       Return in about 1 year (around 4/17/2024) for Recheck.    This note was dictated using Dragon voice recognition.    JAKE Chicas   04/17/2023

## 2023-04-18 LAB
T4 FREE SERPL-MCNC: 1.55 NG/DL (ref 0.93–1.7)
TSH SERPL DL<=0.05 MIU/L-ACNC: 0.98 UIU/ML (ref 0.27–4.2)

## 2023-04-18 RX ORDER — LEVOTHYROXINE SODIUM 175 UG/1
175 TABLET ORAL EVERY OTHER DAY
Qty: 45 TABLET | Refills: 3 | Status: SHIPPED | OUTPATIENT
Start: 2023-04-18

## 2023-04-18 RX ORDER — LEVOTHYROXINE SODIUM 0.2 MG/1
200 TABLET ORAL EVERY OTHER DAY
Qty: 45 TABLET | Refills: 3 | Status: SHIPPED | OUTPATIENT
Start: 2023-04-18

## 2024-04-22 ENCOUNTER — OFFICE VISIT (OUTPATIENT)
Dept: ENDOCRINOLOGY | Facility: CLINIC | Age: 44
End: 2024-04-22
Payer: COMMERCIAL

## 2024-04-22 VITALS
SYSTOLIC BLOOD PRESSURE: 126 MMHG | BODY MASS INDEX: 43.71 KG/M2 | OXYGEN SATURATION: 98 % | DIASTOLIC BLOOD PRESSURE: 70 MMHG | HEIGHT: 64 IN | HEART RATE: 78 BPM | WEIGHT: 256 LBS

## 2024-04-22 DIAGNOSIS — E89.0 POSTSURGICAL HYPOTHYROIDISM: Primary | ICD-10-CM

## 2024-04-22 LAB
T4 FREE SERPL-MCNC: 1.65 NG/DL (ref 0.93–1.7)
TSH SERPL DL<=0.05 MIU/L-ACNC: 0.54 UIU/ML (ref 0.27–4.2)

## 2024-04-22 PROCEDURE — 84439 ASSAY OF FREE THYROXINE: CPT | Performed by: PHYSICIAN ASSISTANT

## 2024-04-22 PROCEDURE — 99213 OFFICE O/P EST LOW 20 MIN: CPT | Performed by: PHYSICIAN ASSISTANT

## 2024-04-22 PROCEDURE — 84443 ASSAY THYROID STIM HORMONE: CPT | Performed by: PHYSICIAN ASSISTANT

## 2024-04-23 RX ORDER — LEVOTHYROXINE SODIUM 175 UG/1
175 TABLET ORAL EVERY OTHER DAY
Qty: 45 TABLET | Refills: 3 | Status: SHIPPED | OUTPATIENT
Start: 2024-04-23

## 2024-04-23 RX ORDER — LEVOTHYROXINE SODIUM 0.2 MG/1
200 TABLET ORAL EVERY OTHER DAY
Qty: 45 TABLET | Refills: 3 | Status: SHIPPED | OUTPATIENT
Start: 2024-04-23

## 2025-04-21 ENCOUNTER — OFFICE VISIT (OUTPATIENT)
Dept: ENDOCRINOLOGY | Facility: CLINIC | Age: 45
End: 2025-04-21
Payer: COMMERCIAL

## 2025-04-21 VITALS
OXYGEN SATURATION: 100 % | HEIGHT: 64 IN | SYSTOLIC BLOOD PRESSURE: 144 MMHG | WEIGHT: 267 LBS | DIASTOLIC BLOOD PRESSURE: 86 MMHG | HEART RATE: 76 BPM | BODY MASS INDEX: 45.58 KG/M2

## 2025-04-21 DIAGNOSIS — E89.0 POSTSURGICAL HYPOTHYROIDISM: Primary | ICD-10-CM

## 2025-04-21 PROCEDURE — 84443 ASSAY THYROID STIM HORMONE: CPT | Performed by: PHYSICIAN ASSISTANT

## 2025-04-21 PROCEDURE — 84439 ASSAY OF FREE THYROXINE: CPT | Performed by: PHYSICIAN ASSISTANT

## 2025-04-21 PROCEDURE — 99213 OFFICE O/P EST LOW 20 MIN: CPT | Performed by: PHYSICIAN ASSISTANT

## 2025-04-21 NOTE — PROGRESS NOTES
"     Office Note      Date: 2025  Patient Name: Usha Moe  MRN: [id number]  : 1980    Chief Complaint   Patient presents with    Hypothyroidism       History of Present Illness:   Usha Moe is a 44 y.o. female who presents today for follow-up and annual for postsurgical hypothyroidism.  She remains on levothyroxine 175 mcg alternating with 200 mcg every other day.  She reports life has been stressful recently.  Her  is now driving a truck for [employer] and is on the road most days.  She reports she is tired all the time and has noted some weight gain.  She reports she also feels like she is more stressed than usual.  Her daughter graduated and is working part-time and helping out with her son.  Her son is in  this year and loves school.      Subjective      Review of Systems:  Review of Systems   Constitutional: Negative.  Positive for fatigue and unexpected weight change.   Cardiovascular: Negative.    Gastrointestinal: Negative.    Endocrine: Negative.    Neurological: Negative.    Psychiatric/Behavioral:  The patient is nervous/anxious.        The following portions of the patient's history were reviewed and updated as appropriate: allergies, current medications, past family history, past medical history, past social history, past surgical history, and problem list.    Objective     Vitals:    25 1240   BP: 144/86   BP Location: Left arm   Patient Position: Sitting   Pulse: 76   SpO2: 100%   Weight: 121 kg (267 lb)   Height: 162.6 cm (64.02\")     Body mass index is 45.81 kg/m².    Physical Exam  Vitals reviewed.   Constitutional:       General: She is not in acute distress.     Appearance: Normal appearance.   Neck:      Thyroid: No thyroid mass, thyromegaly or thyroid tenderness.      Comments: No palpable thyroid tissue  Lymphadenopathy:      Cervical: No cervical adenopathy.   Neurological:      Mental Status: She is alert.         TSH  Lab Results   Component " Patient called about RX being ready for pickup   Value Date    TSH 0.538 04/22/2024       FREE T4  Lab Results   Component Value Date    FREET4 1.65 04/22/2024          Assessment / Plan      Assessment & Plan:  1. Postsurgical hypothyroidism  TFTs pending today.  Will send note with results and plan.  I will refill her prescription once her labs have been reviewed.  If we need to make an adjustment to her dose we will plan to check back in in 6 months but do labs in the interim and if her labs remain stable we will plan to check back in in 1 year.  Patient to call as needed.  - T4, Free; Future  - TSH; Future  - T4, Free  - TSH       Return in about 6 months (around 10/21/2025) for Recheck.    This note was dictated using Dragon voice recognition    Electronically signed by: JAKE Chicas  04/21/2025

## 2025-04-22 ENCOUNTER — RESULTS FOLLOW-UP (OUTPATIENT)
Dept: ENDOCRINOLOGY | Facility: CLINIC | Age: 45
End: 2025-04-22
Payer: COMMERCIAL

## 2025-04-22 DIAGNOSIS — E89.0 POSTSURGICAL HYPOTHYROIDISM: Primary | ICD-10-CM

## 2025-04-22 LAB
T4 FREE SERPL-MCNC: 1.3 NG/DL (ref 0.92–1.68)
TSH SERPL DL<=0.05 MIU/L-ACNC: 3.22 UIU/ML (ref 0.27–4.2)

## 2025-04-22 RX ORDER — LEVOTHYROXINE SODIUM 200 UG/1
200 TABLET ORAL
Qty: 90 TABLET | Refills: 1 | Status: SHIPPED | OUTPATIENT
Start: 2025-04-22

## 2025-04-22 NOTE — TELEPHONE ENCOUNTER
Called patient with lab results-- her TSH has crept up-- we will increase her levothyroxine to 200 mcg every day.  I sent a new prescription to her pharmacy.  We will plan to recheck TFTs in 3 months for monitoring.  I mailed her a lab slip to have these completed.  She will keep her follow-up appointment in 6 months for monitoring.  I encouraged her to reach out if she does not hear from me with the lab results within a week or 2 of having the blood drawn.  Patient to call as needed.